# Patient Record
(demographics unavailable — no encounter records)

---

## 2025-06-30 NOTE — ASSESSMENT
[Vaccines Reviewed] : Immunizations reviewed today. Please see immunization details in the vaccine log within the immunization flowsheet.  [FreeTextEntry1] : new, annual Physical  increased anxiety during testing start escitalopram 5mg daily - Rx sent to pharmacy Recommended relaxation techniques such as yoga, meditation   bloodwork ordered follow up in one week for lab results

## 2025-06-30 NOTE — HISTORY OF PRESENT ILLNESS
[de-identified] : Ms. CISCO TROY is a 19 year old female with presenting for an initial evaluation and annual physical. Pt companied by her mom.  Pt states she is feeling anxious and stressed over the LSAT that she has to take in September.  She says she becomes anxious during testing and sometimes it is overwhelming and she cannot concentrate to finish the test Pt's mom states that pt has always being anxious with school, because she always tries to excel but now her anxiety has worsened. Denies any SOB, CP, abdominal pain, N/V/D, headache, dizziness, or leg swelling.

## 2025-06-30 NOTE — HEALTH RISK ASSESSMENT
[No] : No [0] : 2) Feeling down, depressed, or hopeless: Not at all (0) [PHQ-2 Negative - No further assessment needed] : PHQ-2 Negative - No further assessment needed [With Family] : lives with family [Student] : student [College] : College [Single] : single [Never] : Never [[id number]] : 0 [Sexually Active] : not sexually active

## 2025-06-30 NOTE — ADDENDUM
[FreeTextEntry1] : Documented by Darlyn Smart acting as a scribe for Dr. Kathe Wood. 06/30/2025  All medical records entries made by the scribe were at my, Dr. Wood, direction and personally dictated by me on 06/30/2025. I have reviewed the chart and agree that the record accurately reflects my personal performance of the history, physical exam, assessment and plan. I have also personally directed, reviewed, and agreed with the chart.